# Patient Record
Sex: MALE | Race: BLACK OR AFRICAN AMERICAN | Employment: UNEMPLOYED | URBAN - METROPOLITAN AREA
[De-identification: names, ages, dates, MRNs, and addresses within clinical notes are randomized per-mention and may not be internally consistent; named-entity substitution may affect disease eponyms.]

---

## 2023-01-21 ENCOUNTER — HOSPITAL ENCOUNTER (EMERGENCY)
Age: 28
Discharge: HOME | End: 2023-01-21
Payer: COMMERCIAL

## 2023-01-21 VITALS
HEART RATE: 87 BPM | OXYGEN SATURATION: 100 % | TEMPERATURE: 98 F | DIASTOLIC BLOOD PRESSURE: 79 MMHG | SYSTOLIC BLOOD PRESSURE: 128 MMHG | HEIGHT: 74 IN | RESPIRATION RATE: 17 BRPM | BODY MASS INDEX: 27.59 KG/M2 | WEIGHT: 214.95 LBS

## 2023-01-21 DIAGNOSIS — K08.89 PAIN, DENTAL: Primary | ICD-10-CM

## 2023-01-21 PROCEDURE — 99283 EMERGENCY DEPT VISIT LOW MDM: CPT

## 2023-01-21 RX ORDER — PENICILLIN V POTASSIUM 500 MG/1
500 TABLET ORAL 4 TIMES DAILY
Qty: 40 TABLET | Refills: 0 | Status: SHIPPED | OUTPATIENT
Start: 2023-01-21 | End: 2023-01-31

## 2023-01-21 RX ORDER — IBUPROFEN 400 MG/1
400 TABLET ORAL EVERY 8 HOURS PRN
Qty: 24 TABLET | Refills: 0 | Status: SHIPPED | OUTPATIENT
Start: 2023-01-21

## 2023-01-21 NOTE — DISCHARGE INSTRUCTIONS
Home in good condition to use medications as written, consider using soft foods, and monitor for gradual improvement. Call your dentist of choice to arrange close outpatient follow-up at next available appointment. Return to the emergency department for any emergency worsening or concern.

## 2023-01-21 NOTE — ED PROVIDER NOTES
**ADVANCED PRACTICE PROVIDER, I HAVE EVALUATED THIS PATIENT**        [address]      Pt Name: Abel Castillo  AGQ:[id number]  [place] 1995  Date of evaluation: 1/21/2023  Provider: Sen Lyon PA-C  Note Started: 6:34 PM EST 1/21/2023        Chief Complaint:    Chief Complaint   Patient presents with    Dental Pain     Pt with swollen right lower jaw. First noticed this am. Pain          Nursing Notes, Past Medical Hx, Past Surgical Hx, Social Hx, Allergies, and Family Hx were all reviewed and agreed with or any disagreements were addressed in the HPI.    HPI: (Location, Duration, Timing, Severity, Quality, Assoc Sx, Context, Modifying factors)    History From: patient's mother  Limitations to history : Language non-verbal    Chief Complaint of concern for possible new dental issue    This is a  24 y.o. male who presents and his mother indicates that though the patient cannot communicate his pain, being autistic and nonverbal, they are concerned for possible new dental issue in the left lower posterior area. He did have a previous dental issue there that required intervention in the last year or 2. Now patient was hesitant and did not want to eat his chicken which is typically a favorite of his last evening. Today family felt it might be a bit of increased swelling in the left posterior face area and there was more bleeding in this area with toothbrushing than typical.    PastMedical/Surgical History:  No past medical history on file. No past surgical history on file.   Previous dental intervention left lower posterior jaw      Medications:  Previous Medications    QUETIAPINE FUMARATE (SEROQUEL PO)    Take by mouth    RISPERIDONE (RISPERDAL PO)    Take by mouth    SERTRALINE HCL (ZOLOFT PO)    Take by mouth       Review of Systems:  (1 systems needed)  Review of Systems  Further review of systems unable to be obtained at this time secondary to patient's nonverbal/noncommunicative status ongoing  \"Positives and Pertinent negatives as per HPI\"    Physical Exam:  Physical Exam  Vitals and nursing note reviewed. Constitutional:       Appearance: Normal appearance. He is not diaphoretic. Comments: Patient, while nonverbal, remains engaged and cooperative   HENT:      Head: Normocephalic and atraumatic. Right Ear: External ear normal.      Left Ear: External ear normal.      Nose: Nose normal.      Mouth/Throat:      Mouth: Mucous membranes are moist.      Pharynx: No pharyngeal swelling, oropharyngeal exudate, posterior oropharyngeal erythema or uvula swelling. Tonsils: No tonsillar exudate or tonsillar abscesses. Comments: Gag reflex intact; no trismus or Casimiro's angina or soft tissue fluctuance  Eyes:      General:         Right eye: No discharge. Left eye: No discharge. Conjunctiva/sclera: Conjunctivae normal.   Cardiovascular:      Rate and Rhythm: Normal rate and regular rhythm. Pulses: Normal pulses. Heart sounds: Normal heart sounds. No murmur heard. No gallop. Pulmonary:      Effort: Pulmonary effort is normal. No respiratory distress. Breath sounds: Normal breath sounds. No wheezing, rhonchi or rales. Musculoskeletal:      Cervical back: Normal range of motion and neck supple. No rigidity or tenderness. Lymphadenopathy:      Cervical: No cervical adenopathy. Skin:     General: Skin is warm and dry. Neurological:      Mental Status: He is alert. Mental status is at baseline.    Psychiatric:         Mood and Affect: Mood normal.         Behavior: Behavior normal.       MEDICAL DECISION MAKING    Vitals:    Vitals:    01/21/23 1800   BP: 132/80   Pulse: 90   Resp: 17   Temp: 98.1 °F (36.7 °C)   TempSrc: Oral   SpO2: 99%   Weight: 214 lb 15.2 oz (97.5 kg)   Height: 6' 2\" (1.88 m)       LABS:Labs Reviewed - No data to display       RADIOLOGY:   Non-plain film images such as CT, Ultrasound and MRI are read by the radiologist. Rosalinda Caballero PA-C have directly visualized the radiologic plain film image(s) with the below findings:      Interpretation per the Radiologist below, if available at the time of this note:    No orders to display     No results found. No results found. MEDICAL DECISION MAKING / ED COURSE:      PROCEDURES:   Procedures    None    Patient was given:  Medications - No data to display    CONSULTS: (Who and What was discussed)  None          Chronic Conditions affecting care: Very limited verbal expression, considered nonverbal and with autism   has no past medical history on file. Records Reviewed( Source) electronic medical records reviewed including from [facility] to better help with patient's care    CC/HPI Summary, DDx, ED Course, and Reassessment: Patient presents as above and evaluation and treatment is begun here. Complaint of potential left lower posterior tooth issue is present. No observed or obvious edema to this area but reportedly has bled a bit more also when family has been brushing his teeth. He does have previous history of some dental erosion in this area that required dental care in the past.  Patient is really unable to communicate concerning pain or discomfort. With history, we will go ahead and write for some ibuprofen for the patient to be using outpatient as well as antibiotic in case this is the start of a new dental issue. Patient's mother will work on getting into a dentist.  Vital signs and physical exam otherwise look good. Therefore patient will be discharged as follows. Disposition Considerations (Tests not ordered but considered, Shared Decision Making, Pt Expectation of Test or Tx.):   Home in good condition to use medications as written, consider using soft foods, and monitor for gradual improvement. Call your dentist of choice to arrange close outpatient follow-up at next available appointment. Return to the emergency department for any emergency worsening or concern. The patient tolerated their visit well. I evaluated the patient. The physician was available for consultation as needed. The patient and / or the family were informed of the results of any tests, a time was given to answer questions, a plan was proposed and they agreed with plan. I am the Primary Clinician of Record. CLINICAL IMPRESSION:  1.  Pain, dental        DISPOSITION Decision To Discharge 01/21/2023 06:31:24 PM      PATIENT REFERRED TO:   [facility]  [facility] Siegel0   3.2 (59) · [place], [place] · [phone]   Open 24 hours     [facility]   4.9 (133) · Oral surgeon   [place] · [id number] [address]:  New Prescriptions    IBUPROFEN (IBU) 400 MG TABLET    Take 1 tablet by mouth every 8 hours as needed for Pain (with food)    PENICILLIN V POTASSIUM (VEETID) 500 MG TABLET    Take 1 tablet by mouth 4 times daily for 10 days       DISCONTINUED MEDICATIONS:  Discontinued Medications    IBUPROFEN (CHILDRENS ADVIL) 100 MG/5ML SUSPENSION    Take 30 mLs by mouth every 8 hours as needed for Fever              (Please note the MDM and HPI sections of this note were completed with a voice recognition program.  Efforts were made to edit the dictations but occasionally words are mis-transcribed.)    Electronically signed, Marco Padron PA-C,          Marco Padron PA-C  01/21/23 0712